# Patient Record
Sex: MALE | URBAN - METROPOLITAN AREA
[De-identification: names, ages, dates, MRNs, and addresses within clinical notes are randomized per-mention and may not be internally consistent; named-entity substitution may affect disease eponyms.]

---

## 2019-11-21 ENCOUNTER — OFFICE VISIT (OUTPATIENT)
Dept: URBAN - METROPOLITAN AREA CLINIC 71 | Facility: CLINIC | Age: 34
End: 2019-11-21
Payer: COMMERCIAL

## 2019-11-21 DIAGNOSIS — T15.01XA FOREIGN BODY IN CORNEA, RIGHT EYE, INITIAL ENCOUNTER: Primary | ICD-10-CM

## 2019-11-21 PROCEDURE — 92002 INTRM OPH EXAM NEW PATIENT: CPT | Performed by: OPTOMETRIST

## 2019-11-21 RX ORDER — OFLOXACIN 3 MG/ML
0.3 % SOLUTION/ DROPS OPHTHALMIC
Qty: 5 | Refills: 0 | Status: INACTIVE
Start: 2019-11-21 | End: 2019-11-25

## 2019-11-21 ASSESSMENT — INTRAOCULAR PRESSURE
OD: 15
OS: 17

## 2019-11-21 NOTE — IMPRESSION/PLAN
Impression: Foreign body in cornea, right eye, initial encounter: T15.01xA. metallic FB in cornea temporally Plan: removed metallic FB with hypodermic needle and cotton swab. Rust removed with [place]. Pt tolerated well. No complications. instilled one drop Cyclo 1% in OD in office. Begin oflox QID OD for 1 week. May need steroid after cornea healed better.

## 2019-11-25 ENCOUNTER — OFFICE VISIT (OUTPATIENT)
Dept: URBAN - METROPOLITAN AREA CLINIC 71 | Facility: CLINIC | Age: 34
End: 2019-11-25
Payer: COMMERCIAL

## 2019-11-25 DIAGNOSIS — T15.01XD FOREIGN BODY IN CORNEA, RIGHT EYE, SUBSEQUENT ENCOUNTER: Primary | ICD-10-CM

## 2019-11-25 PROCEDURE — 99213 OFFICE O/P EST LOW 20 MIN: CPT | Performed by: OPTOMETRIST

## 2019-11-25 ASSESSMENT — INTRAOCULAR PRESSURE
OS: 22
OD: 18

## 2019-11-25 NOTE — IMPRESSION/PLAN
Impression: Foreign body in cornea, right eye, subsequent encounter: T15.01xD. wound healing well. Didn't use antibiotic as advisved. Plan: Discussed. No further treatment needed. Continue to monitor.

## 2020-03-04 ENCOUNTER — OFFICE VISIT (OUTPATIENT)
Dept: URBAN - METROPOLITAN AREA CLINIC 71 | Facility: CLINIC | Age: 35
End: 2020-03-04
Payer: COMMERCIAL

## 2020-03-04 PROCEDURE — 92012 INTRM OPH EXAM EST PATIENT: CPT | Performed by: OPTOMETRIST

## 2020-03-04 RX ORDER — CIPROFLOXACIN HYDROCHLORIDE 3.5 MG/ML
0.3 % SOLUTION/ DROPS TOPICAL
Qty: 1 | Refills: 0 | Status: ACTIVE
Start: 2020-03-04

## 2020-03-04 ASSESSMENT — INTRAOCULAR PRESSURE
OD: 19
OS: 20

## 2020-03-04 ASSESSMENT — KERATOMETRY
OS: 45.13
OD: 45.25

## 2020-03-04 NOTE — IMPRESSION/PLAN
Impression: Foreign body in cornea, left eye, initial encounter: T15.02xA. Left. Plan: Rust ring in place, Bandage contact lens put in place. Continue Cipro QID OS x 1 week.

## 2020-03-05 ENCOUNTER — OFFICE VISIT (OUTPATIENT)
Dept: URBAN - METROPOLITAN AREA CLINIC 71 | Facility: CLINIC | Age: 35
End: 2020-03-05
Payer: COMMERCIAL

## 2020-03-05 DIAGNOSIS — T15.02XA FOREIGN BODY IN CORNEA, LEFT EYE, INITIAL ENCOUNTER: Primary | ICD-10-CM

## 2020-03-05 PROCEDURE — 99212 OFFICE O/P EST SF 10 MIN: CPT | Performed by: OPTOMETRIST

## 2020-03-05 ASSESSMENT — KERATOMETRY
OS: 44.50
OD: 44.88

## 2020-03-05 ASSESSMENT — INTRAOCULAR PRESSURE
OD: 20
OS: 15

## 2020-03-05 NOTE — IMPRESSION/PLAN
Impression: Foreign body in cornea, left eye, initial encounter: T15.02xA. Left. Plan: still little rust ring OS. Continue to use Cipro QID OS x 1 week.  

- removed previous BCL